# Patient Record
Sex: MALE | Race: WHITE | NOT HISPANIC OR LATINO | URBAN - METROPOLITAN AREA
[De-identification: names, ages, dates, MRNs, and addresses within clinical notes are randomized per-mention and may not be internally consistent; named-entity substitution may affect disease eponyms.]

---

## 2018-09-05 ENCOUNTER — INPATIENT (INPATIENT)
Facility: HOSPITAL | Age: 83
LOS: 5 days | Discharge: NURSING, REHAB OR LONG-TERM CARE | DRG: 690 | End: 2018-09-11
Attending: INTERNAL MEDICINE | Admitting: INTERNAL MEDICINE
Payer: MEDICARE

## 2018-09-05 VITALS
SYSTOLIC BLOOD PRESSURE: 162 MMHG | TEMPERATURE: 98 F | HEART RATE: 78 BPM | DIASTOLIC BLOOD PRESSURE: 86 MMHG | OXYGEN SATURATION: 98 % | RESPIRATION RATE: 20 BRPM

## 2018-09-05 DIAGNOSIS — R41.82 ALTERED MENTAL STATUS, UNSPECIFIED: ICD-10-CM

## 2018-09-05 LAB
ALBUMIN SERPL ELPH-MCNC: 4.3 G/DL — SIGNIFICANT CHANGE UP (ref 3.3–5)
ALP SERPL-CCNC: 144 U/L — HIGH (ref 40–120)
ALT FLD-CCNC: 10 U/L — SIGNIFICANT CHANGE UP (ref 10–45)
ANION GAP SERPL CALC-SCNC: 14 MMOL/L — SIGNIFICANT CHANGE UP (ref 5–17)
AST SERPL-CCNC: 20 U/L — SIGNIFICANT CHANGE UP (ref 10–40)
BASOPHILS # BLD AUTO: 0.1 K/UL — SIGNIFICANT CHANGE UP (ref 0–0.2)
BASOPHILS NFR BLD AUTO: 0.6 % — SIGNIFICANT CHANGE UP (ref 0–2)
BILIRUB SERPL-MCNC: 0.5 MG/DL — SIGNIFICANT CHANGE UP (ref 0.2–1.2)
BUN SERPL-MCNC: 20 MG/DL — SIGNIFICANT CHANGE UP (ref 7–23)
CALCIUM SERPL-MCNC: 9.8 MG/DL — SIGNIFICANT CHANGE UP (ref 8.4–10.5)
CHLORIDE SERPL-SCNC: 97 MMOL/L — SIGNIFICANT CHANGE UP (ref 96–108)
CO2 SERPL-SCNC: 25 MMOL/L — SIGNIFICANT CHANGE UP (ref 22–31)
CREAT SERPL-MCNC: 1.07 MG/DL — SIGNIFICANT CHANGE UP (ref 0.5–1.3)
EOSINOPHIL # BLD AUTO: 0.3 K/UL — SIGNIFICANT CHANGE UP (ref 0–0.5)
EOSINOPHIL NFR BLD AUTO: 3.1 % — SIGNIFICANT CHANGE UP (ref 0–6)
GAS PNL BLDV: SIGNIFICANT CHANGE UP
GLUCOSE SERPL-MCNC: 112 MG/DL — HIGH (ref 70–99)
HCT VFR BLD CALC: 48.2 % — SIGNIFICANT CHANGE UP (ref 39–50)
HGB BLD-MCNC: 15.4 G/DL — SIGNIFICANT CHANGE UP (ref 13–17)
LYMPHOCYTES # BLD AUTO: 2.5 K/UL — SIGNIFICANT CHANGE UP (ref 1–3.3)
LYMPHOCYTES # BLD AUTO: 25.9 % — SIGNIFICANT CHANGE UP (ref 13–44)
MCHC RBC-ENTMCNC: 27.6 PG — SIGNIFICANT CHANGE UP (ref 27–34)
MCHC RBC-ENTMCNC: 31.9 GM/DL — LOW (ref 32–36)
MCV RBC AUTO: 86.6 FL — SIGNIFICANT CHANGE UP (ref 80–100)
MONOCYTES # BLD AUTO: 0.7 K/UL — SIGNIFICANT CHANGE UP (ref 0–0.9)
MONOCYTES NFR BLD AUTO: 7.1 % — SIGNIFICANT CHANGE UP (ref 2–14)
NEUTROPHILS # BLD AUTO: 6.2 K/UL — SIGNIFICANT CHANGE UP (ref 1.8–7.4)
NEUTROPHILS NFR BLD AUTO: 63.4 % — SIGNIFICANT CHANGE UP (ref 43–77)
PLATELET # BLD AUTO: 277 K/UL — SIGNIFICANT CHANGE UP (ref 150–400)
POTASSIUM SERPL-MCNC: 4.5 MMOL/L — SIGNIFICANT CHANGE UP (ref 3.5–5.3)
POTASSIUM SERPL-SCNC: 4.5 MMOL/L — SIGNIFICANT CHANGE UP (ref 3.5–5.3)
PROT SERPL-MCNC: 7.7 G/DL — SIGNIFICANT CHANGE UP (ref 6–8.3)
RBC # BLD: 5.56 M/UL — SIGNIFICANT CHANGE UP (ref 4.2–5.8)
RBC # FLD: 13.2 % — SIGNIFICANT CHANGE UP (ref 10.3–14.5)
SODIUM SERPL-SCNC: 136 MMOL/L — SIGNIFICANT CHANGE UP (ref 135–145)
TROPONIN T, HIGH SENSITIVITY RESULT: 22 NG/L — SIGNIFICANT CHANGE UP (ref 0–51)
WBC # BLD: 9.8 K/UL — SIGNIFICANT CHANGE UP (ref 3.8–10.5)
WBC # FLD AUTO: 9.8 K/UL — SIGNIFICANT CHANGE UP (ref 3.8–10.5)

## 2018-09-05 PROCEDURE — 74018 RADEX ABDOMEN 1 VIEW: CPT | Mod: 26

## 2018-09-05 PROCEDURE — 93010 ELECTROCARDIOGRAM REPORT: CPT

## 2018-09-05 PROCEDURE — 99285 EMERGENCY DEPT VISIT HI MDM: CPT | Mod: GC,25

## 2018-09-05 PROCEDURE — 71045 X-RAY EXAM CHEST 1 VIEW: CPT | Mod: 26

## 2018-09-05 PROCEDURE — 70450 CT HEAD/BRAIN W/O DYE: CPT | Mod: 26

## 2018-09-05 RX ORDER — HEPARIN SODIUM 5000 [USP'U]/ML
5000 INJECTION INTRAVENOUS; SUBCUTANEOUS EVERY 12 HOURS
Qty: 0 | Refills: 0 | Status: DISCONTINUED | OUTPATIENT
Start: 2018-09-05 | End: 2018-09-11

## 2018-09-05 RX ORDER — CARBIDOPA AND LEVODOPA 25; 100 MG/1; MG/1
1 TABLET ORAL THREE TIMES A DAY
Qty: 0 | Refills: 0 | Status: DISCONTINUED | OUTPATIENT
Start: 2018-09-05 | End: 2018-09-11

## 2018-09-05 RX ORDER — ATORVASTATIN CALCIUM 80 MG/1
20 TABLET, FILM COATED ORAL AT BEDTIME
Qty: 0 | Refills: 0 | Status: DISCONTINUED | OUTPATIENT
Start: 2018-09-05 | End: 2018-09-11

## 2018-09-05 RX ORDER — QUETIAPINE FUMARATE 200 MG/1
25 TABLET, FILM COATED ORAL
Qty: 0 | Refills: 0 | Status: DISCONTINUED | OUTPATIENT
Start: 2018-09-05 | End: 2018-09-11

## 2018-09-05 RX ORDER — SODIUM CHLORIDE 9 MG/ML
1000 INJECTION INTRAMUSCULAR; INTRAVENOUS; SUBCUTANEOUS
Qty: 0 | Refills: 0 | Status: DISCONTINUED | OUTPATIENT
Start: 2018-09-05 | End: 2018-09-09

## 2018-09-05 RX ORDER — SENNA PLUS 8.6 MG/1
2 TABLET ORAL AT BEDTIME
Qty: 0 | Refills: 0 | Status: DISCONTINUED | OUTPATIENT
Start: 2018-09-05 | End: 2018-09-11

## 2018-09-05 RX ORDER — FINASTERIDE 5 MG/1
5 TABLET, FILM COATED ORAL DAILY
Qty: 0 | Refills: 0 | Status: DISCONTINUED | OUTPATIENT
Start: 2018-09-05 | End: 2018-09-11

## 2018-09-05 RX ORDER — HALOPERIDOL DECANOATE 100 MG/ML
1 INJECTION INTRAMUSCULAR EVERY 12 HOURS
Qty: 0 | Refills: 0 | Status: DISCONTINUED | OUTPATIENT
Start: 2018-09-05 | End: 2018-09-11

## 2018-09-05 RX ADMIN — HALOPERIDOL DECANOATE 1 MILLIGRAM(S): 100 INJECTION INTRAMUSCULAR at 20:29

## 2018-09-05 RX ADMIN — Medication 1 ENEMA: at 20:27

## 2018-09-05 RX ADMIN — SODIUM CHLORIDE 70 MILLILITER(S): 9 INJECTION INTRAMUSCULAR; INTRAVENOUS; SUBCUTANEOUS at 22:12

## 2018-09-05 RX ADMIN — HEPARIN SODIUM 5000 UNIT(S): 5000 INJECTION INTRAVENOUS; SUBCUTANEOUS at 22:48

## 2018-09-05 NOTE — ED ADULT NURSE NOTE - OBJECTIVE STATEMENT
85 y/o male A&O x 0 PMH Parkinson's, dementia presents to ED via EMS from home c/o  increased AMS, no BM in 5 days. Per daughter at bedside, pt is normally A&O x1, disoriented to time and place, ambulatory with walker, live sat home with wife and has an aide. Daughter states that yesterday, pt began having increased confusion, b/l leg weakness. She says he was moaning while sleeping, appeared uncomfortable. Today, pt speech garbled and incoherent for the most part. Per aide, pt had not had BM in 5 days. Upon arrival, pt irritable and flailing arms. Lungs clear b/l. Skin warm, dry, intact. Motor intact. 20G IV placed in b/l forearms. NSR on cardiac monitor. Attempted straight cath with OLEGARIO Woods at bedside without success due to resistance. MD aware. Abdomen soft, non-tender, non-distended. Safety and comfort provided.

## 2018-09-05 NOTE — H&P ADULT - NSHPPHYSICALEXAM_GEN_ALL_CORE
PHYSICAL EXAMINATION:  Vital Signs Last 24 Hrs  T(C): 36.8 (05 Sep 2018 19:00), Max: 36.8 (05 Sep 2018 19:00)  T(F): 98.3 (05 Sep 2018 19:00), Max: 98.3 (05 Sep 2018 19:00)  HR: 78 (05 Sep 2018 19:00) (78 - 78)  BP: 162/86 (05 Sep 2018 19:00) (162/86 - 162/86)  BP(mean): --  RR: 20 (05 Sep 2018 19:00) (20 - 20)  SpO2: 98% (05 Sep 2018 19:00) (98% - 98%)  CAPILLARY BLOOD GLUCOSE            GENERAL: NAD, well-groomed,  HEAD:  atraumatic, normocephalic  EYES: sclera anicteric  ENMT: mucous membranes moist  NECK: supple, No JVD  CHEST/LUNG: clear to auscultation bilaterally;    no      rales   ,   no rhonchi,   HEART: normal S1, S2  ABDOMEN: BS+, soft, ND, NT   EXTREMITIES:    no    edema    b/l LEs  NEURO:   non verbal, be d bound,    , tremprs  SKIN: no     rash

## 2018-09-05 NOTE — H&P ADULT - ASSESSMENT
pt    with  h/o  parkinson's ,    dementia,,    v/p  shunt, turp,  hydrocephalus  , admitted   for increasing agitation/  confusion  for past  week per  family at bedside  pt  ha s been bed  bound for past week   denies   fevers/  cp /sob  /a bd  pain  constipated  for more than  5  days, pe r family   pt is  non verbal  labs/  cxr, pending  dehydration/ iv fluids  laxatives/  enema  dvt ppx   PT eval pt    with  h/o  parkinson's ,    dementia,,    v/p  shunt, turp,  hydrocephalus  , admitted   for increasing agitation/  confusion  for past  week per  family, who is   at bedside  pt  ha s been bed  bound for past week   denies   fevers/  cp /sob  /a bd  pain  constipated  for more than  5  days, pe r family   pt is  non verbal  labs/  cxr, pending  dehydration/ iv fluids  laxatives/  enema  concern  for  aspiration  exists,  however  family states he  was  able to  eat , food  that ha s been cut up  on dysphagia  2 diet  dvt ppx   PT eval pt    with  h/o  parkinson's ,    dementia,,    v/p  shunt, turp,  hydrocephalus  , admitted   for increasing agitation/  confusion  for past  week per  family, who is   at bedside  pt  ha s been bed  bound for past week   denies   fevers/  cp /sob  /a bd  pain  constipated  for more than  5  days, pe r family   pt is  non verbal  labs/  cxr, pending  dehydration/ iv fluids  laxatives/  enema  concern  for  aspiration  exists,  however  family states he  was  able to  eat , food  that ha s been cut up  on dysphagia  2 diet/  discussed  with  np  dvt ppx   PT eval pt    with  h/o  parkinson's ,    dementia,,    v/p  shunt, turp,  hydrocephalus  , admitted   for increasing agitation/  confusion  for past  week per  family, who is   at bedside  pt  ha s been bed  bound for past week   denies   fevers/  cp /sob  /a bd  pain  constipated  for more than  5  days, pe r family   pt is  non verbal  labs/  cxr, pending  dehydration/ iv fluids  laxatives/  enema  concern  for  aspiration  exists,  however  family states he  was  able to  eat , food  that ha s been cut up  on dysphagia  2 diet/  discussed  with  np  haldol prn  for agitation, if   agitation worsens,  then  psych  to be called  dvt ppx   PT eval

## 2018-09-05 NOTE — ED PROVIDER NOTE - ATTENDING CONTRIBUTION TO CARE
Patient with worsening confusion. Moderate to severe. Progressive. Increasingly weak. Not better with time. Patient with decreased bowel movements.   Will get iv, cbc, cmp, xray a/p for afl, ct head, ua and reassess  Will follow up on labs, analgesia, reassess and disposition as clinically indicated.

## 2018-09-05 NOTE — ED PROVIDER NOTE - OBJECTIVE STATEMENT
87yo M pmhx Parkinson's, Dementia p/w CC worsening confusion and weakness, family states that yesterday pt. had and episode of screaming and agitation, which resolved, however has been increasingly weak and unable to sit up. Family states pt. is also more confused than usual and does not recognize any of his family members. Has not had BM in 5 days, but also has not been eating much. Family denies fevers, v/d, rash.

## 2018-09-05 NOTE — ED ADULT NURSE REASSESSMENT NOTE - NS ED NURSE REASSESS COMMENT FT1
OLEGARIO Asencio attempted straight cath without success. Blood clots noted. NP Erica aware. Awaiitng bed upstairs.

## 2018-09-05 NOTE — H&P ADULT - HISTORY OF PRESENT ILLNESS
pt  with  h/o  parkinson's ,    dementia,,    v/p  shunt, turp,  hydrocepahlus  , admitted   for increasing agitation/  confusion  for past  week per  family at bedside  pt  ha s been bed  bound for past week   denies   fevers/  cp /spb/a bd  pain  constipated  for more than  5  days, pe r family   pt is  non verbal

## 2018-09-05 NOTE — ED ADULT NURSE NOTE - NSIMPLEMENTINTERV_GEN_ALL_ED
Implemented All Fall with Harm Risk Interventions:  [place] to call system. Call bell, personal items and telephone within reach. Instruct patient to call for assistance. Room bathroom lighting operational. Non-slip footwear when patient is off stretcher. Physically safe environment: no spills, clutter or unnecessary equipment. Stretcher in lowest position, wheels locked, appropriate side rails in place. Provide visual cue, wrist band, yellow gown, etc. Monitor gait and stability. Monitor for mental status changes and reorient to person, place, and time. Review medications for side effects contributing to fall risk. Reinforce activity limits and safety measures with patient and family. Provide visual clues: red socks.

## 2018-09-05 NOTE — H&P ADULT - PMH
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

## 2018-09-05 NOTE — ED ADULT NURSE REASSESSMENT NOTE - NS ED NURSE REASSESS COMMENT FT1
Pt very agitated and flailing arms. Straight cath attempted with OLEGARIO Woods at bedside to confirm sterility. Resistance met and no urine obtained. MD Tracy aware. 5mg haldol given for pt to go to CT.

## 2018-09-06 LAB
ALBUMIN SERPL ELPH-MCNC: 4.2 G/DL — SIGNIFICANT CHANGE UP (ref 3.3–5)
ALP SERPL-CCNC: 144 U/L — HIGH (ref 40–120)
ALT FLD-CCNC: 18 U/L — SIGNIFICANT CHANGE UP (ref 10–45)
ANION GAP SERPL CALC-SCNC: 14 MMOL/L — SIGNIFICANT CHANGE UP (ref 5–17)
APPEARANCE UR: ABNORMAL
AST SERPL-CCNC: 17 U/L — SIGNIFICANT CHANGE UP (ref 10–40)
BILIRUB SERPL-MCNC: 0.6 MG/DL — SIGNIFICANT CHANGE UP (ref 0.2–1.2)
BILIRUB UR-MCNC: NEGATIVE — SIGNIFICANT CHANGE UP
BUN SERPL-MCNC: 21 MG/DL — SIGNIFICANT CHANGE UP (ref 7–23)
CALCIUM SERPL-MCNC: 9.3 MG/DL — SIGNIFICANT CHANGE UP (ref 8.4–10.5)
CHLORIDE SERPL-SCNC: 99 MMOL/L — SIGNIFICANT CHANGE UP (ref 96–108)
CO2 SERPL-SCNC: 23 MMOL/L — SIGNIFICANT CHANGE UP (ref 22–31)
COLOR SPEC: YELLOW — SIGNIFICANT CHANGE UP
CREAT SERPL-MCNC: 1.14 MG/DL — SIGNIFICANT CHANGE UP (ref 0.5–1.3)
DIFF PNL FLD: ABNORMAL
GLUCOSE SERPL-MCNC: 124 MG/DL — HIGH (ref 70–99)
GLUCOSE UR QL: NEGATIVE — SIGNIFICANT CHANGE UP
HCT VFR BLD CALC: 45.5 % — SIGNIFICANT CHANGE UP (ref 39–50)
HGB BLD-MCNC: 14.9 G/DL — SIGNIFICANT CHANGE UP (ref 13–17)
KETONES UR-MCNC: ABNORMAL
LACTATE SERPL-SCNC: 1.7 MMOL/L — SIGNIFICANT CHANGE UP (ref 0.7–2)
LACTATE SERPL-SCNC: 4 MMOL/L — CRITICAL HIGH (ref 0.7–2)
LEUKOCYTE ESTERASE UR-ACNC: ABNORMAL
MCHC RBC-ENTMCNC: 28.3 PG — SIGNIFICANT CHANGE UP (ref 27–34)
MCHC RBC-ENTMCNC: 32.7 GM/DL — SIGNIFICANT CHANGE UP (ref 32–36)
MCV RBC AUTO: 86.6 FL — SIGNIFICANT CHANGE UP (ref 80–100)
NITRITE UR-MCNC: NEGATIVE — SIGNIFICANT CHANGE UP
PH UR: 6 — SIGNIFICANT CHANGE UP (ref 5–8)
PLATELET # BLD AUTO: 274 K/UL — SIGNIFICANT CHANGE UP (ref 150–400)
POTASSIUM SERPL-MCNC: 3.8 MMOL/L — SIGNIFICANT CHANGE UP (ref 3.5–5.3)
POTASSIUM SERPL-SCNC: 3.8 MMOL/L — SIGNIFICANT CHANGE UP (ref 3.5–5.3)
PROT SERPL-MCNC: 7.3 G/DL — SIGNIFICANT CHANGE UP (ref 6–8.3)
PROT UR-MCNC: 30 MG/DL
RBC # BLD: 5.26 M/UL — SIGNIFICANT CHANGE UP (ref 4.2–5.8)
RBC # FLD: 13.1 % — SIGNIFICANT CHANGE UP (ref 10.3–14.5)
SODIUM SERPL-SCNC: 136 MMOL/L — SIGNIFICANT CHANGE UP (ref 135–145)
SP GR SPEC: 1.02 — SIGNIFICANT CHANGE UP (ref 1.01–1.02)
UROBILINOGEN FLD QL: 1 MG/DL
WBC # BLD: 11.5 K/UL — HIGH (ref 3.8–10.5)
WBC # FLD AUTO: 11.5 K/UL — HIGH (ref 3.8–10.5)

## 2018-09-06 RX ORDER — CEFTRIAXONE 500 MG/1
1 INJECTION, POWDER, FOR SOLUTION INTRAMUSCULAR; INTRAVENOUS ONCE
Qty: 0 | Refills: 0 | Status: COMPLETED | OUTPATIENT
Start: 2018-09-06 | End: 2018-09-06

## 2018-09-06 RX ORDER — SODIUM CHLORIDE 9 MG/ML
1000 INJECTION INTRAMUSCULAR; INTRAVENOUS; SUBCUTANEOUS ONCE
Qty: 0 | Refills: 0 | Status: COMPLETED | OUTPATIENT
Start: 2018-09-06 | End: 2018-09-06

## 2018-09-06 RX ORDER — CEFTRIAXONE 500 MG/1
1 INJECTION, POWDER, FOR SOLUTION INTRAMUSCULAR; INTRAVENOUS EVERY 24 HOURS
Qty: 0 | Refills: 0 | Status: DISCONTINUED | OUTPATIENT
Start: 2018-09-07 | End: 2018-09-09

## 2018-09-06 RX ORDER — MORPHINE SULFATE 50 MG/1
1 CAPSULE, EXTENDED RELEASE ORAL ONCE
Qty: 0 | Refills: 0 | Status: DISCONTINUED | OUTPATIENT
Start: 2018-09-06 | End: 2018-09-06

## 2018-09-06 RX ORDER — CEFTRIAXONE 500 MG/1
INJECTION, POWDER, FOR SOLUTION INTRAMUSCULAR; INTRAVENOUS
Qty: 0 | Refills: 0 | Status: DISCONTINUED | OUTPATIENT
Start: 2018-09-06 | End: 2018-09-09

## 2018-09-06 RX ORDER — HALOPERIDOL DECANOATE 100 MG/ML
1 INJECTION INTRAMUSCULAR ONCE
Qty: 0 | Refills: 0 | Status: DISCONTINUED | OUTPATIENT
Start: 2018-09-06 | End: 2018-09-11

## 2018-09-06 RX ORDER — DUTASTERIDE 0.5 MG/1
1 CAPSULE, LIQUID FILLED ORAL
Qty: 0 | Refills: 0 | COMMUNITY

## 2018-09-06 RX ORDER — ACETAMINOPHEN 500 MG
1000 TABLET ORAL ONCE
Qty: 0 | Refills: 0 | Status: COMPLETED | OUTPATIENT
Start: 2018-09-06 | End: 2018-09-06

## 2018-09-06 RX ADMIN — HEPARIN SODIUM 5000 UNIT(S): 5000 INJECTION INTRAVENOUS; SUBCUTANEOUS at 13:49

## 2018-09-06 RX ADMIN — MORPHINE SULFATE 1 MILLIGRAM(S): 50 CAPSULE, EXTENDED RELEASE ORAL at 03:24

## 2018-09-06 RX ADMIN — MORPHINE SULFATE 1 MILLIGRAM(S): 50 CAPSULE, EXTENDED RELEASE ORAL at 04:00

## 2018-09-06 RX ADMIN — ATORVASTATIN CALCIUM 20 MILLIGRAM(S): 80 TABLET, FILM COATED ORAL at 22:15

## 2018-09-06 RX ADMIN — HALOPERIDOL DECANOATE 1 MILLIGRAM(S): 100 INJECTION INTRAMUSCULAR at 22:15

## 2018-09-06 RX ADMIN — SODIUM CHLORIDE 70 MILLILITER(S): 9 INJECTION INTRAMUSCULAR; INTRAVENOUS; SUBCUTANEOUS at 13:55

## 2018-09-06 RX ADMIN — SENNA PLUS 2 TABLET(S): 8.6 TABLET ORAL at 22:15

## 2018-09-06 RX ADMIN — CARBIDOPA AND LEVODOPA 1 TABLET(S): 25; 100 TABLET ORAL at 22:15

## 2018-09-06 RX ADMIN — HEPARIN SODIUM 5000 UNIT(S): 5000 INJECTION INTRAVENOUS; SUBCUTANEOUS at 22:15

## 2018-09-06 RX ADMIN — CEFTRIAXONE 100 GRAM(S): 500 INJECTION, POWDER, FOR SOLUTION INTRAMUSCULAR; INTRAVENOUS at 08:04

## 2018-09-06 RX ADMIN — SODIUM CHLORIDE 2000 MILLILITER(S): 9 INJECTION INTRAMUSCULAR; INTRAVENOUS; SUBCUTANEOUS at 06:57

## 2018-09-06 RX ADMIN — QUETIAPINE FUMARATE 25 MILLIGRAM(S): 200 TABLET, FILM COATED ORAL at 22:16

## 2018-09-06 RX ADMIN — SODIUM CHLORIDE 70 MILLILITER(S): 9 INJECTION INTRAMUSCULAR; INTRAVENOUS; SUBCUTANEOUS at 22:16

## 2018-09-06 RX ADMIN — HALOPERIDOL DECANOATE 1 MILLIGRAM(S): 100 INJECTION INTRAMUSCULAR at 08:39

## 2018-09-06 RX ADMIN — Medication 400 MILLIGRAM(S): at 13:49

## 2018-09-06 NOTE — PROGRESS NOTE ADULT - SUBJECTIVE AND OBJECTIVE BOX
Male  Patient is a 86y old  Male who presents with a chief complaint of agitation (05 Sep 2018 19:31)      HPI:  pt  with  h/o  parkinson's ,    dementia,,    v/p  shunt, turp,  hydrocepahlus  , admitted   for increasing agitation/  confusion  for past  week per  family at bedside  pt  ha s been bed  bound for past week   denies   fevers/  cp /spb/a bd  pain  constipated  for more than  5  days, pe r family   pt is  non verbal (05 Sep 2018 19:31)    Medicine attending    Pt sent in from home for admission after d/w pt daughter Sammie Acosta  Pt has deteriorated rapidly.  worsening dementia and confusion with behavioral outbursts, usually screaming.  He often does not recognize family members.      Vital Signs Last 24 Hrs  T(C): 37.1 (06 Sep 2018 06:30), Max: 37.1 (06 Sep 2018 06:30)  T(F): 98.8 (06 Sep 2018 06:30), Max: 98.8 (06 Sep 2018 06:30)  HR: 87 (06 Sep 2018 06:30) (70 - 87)  BP: 138/66 (06 Sep 2018 06:30) (127/61 - 163/61)  BP(mean): --  RR: 20 (06 Sep 2018 06:30) (20 - 20)  SpO2: 98% (06 Sep 2018 06:30) (98% - 98%)  Daily     Daily     09-05 @ 07:01  -  09-06 @ 07:00  --------------------------------------------------------  IN: 0 mL / OUT: 175 mL / NET: -175 mL        PHYSICAL EXAM:    Eyes: Open to arousal, non verbal    ENMT: anicteric    Neck: No increased JVP    Respiratory: Lungs clear    Cardiovascular:  RRR nl S1S2, no M    Gastrointestinal:  Soft, NT    Extremities:  No edema    Neurological: Non verbal, moves all fours    Skin: Warm and dry    Psychiatric:  Calm currently                                14.9   11.5  )-----------( 274      ( 06 Sep 2018 06:44 )             45.5     09-06    136  |  99  |  21  ----------------------------<  124<H>  3.8   |  23  |  1.14    Ca    9.3      06 Sep 2018 05:41    TPro  7.3  /  Alb  4.2  /  TBili  0.6  /  DBili  x   /  AST  17  /  ALT  18  /  AlkPhos  144<H>  09-06          MEDICATIONS  (STANDING):  atorvastatin 20 milliGRAM(s) Oral at bedtime  carbidopa/levodopa  25/100 1 Tablet(s) Oral three times a day  cefTRIAXone   IVPB      finasteride 5 milliGRAM(s) Oral daily  heparin  Injectable 5000 Unit(s) SubCutaneous every 12 hours  QUEtiapine 25 milliGRAM(s) Oral two times a day  senna 2 Tablet(s) Oral at bedtime  sodium chloride 0.9%. 1000 milliLiter(s) (70 mL/Hr) IV Continuous <Continuous>    MEDICATIONS  (PRN):  haloperidol    Injectable 1 milliGRAM(s) IntraMuscular every 12 hours PRN Agitation      Impression    Depression, Dementia, NPH, PD with overall worsening. Occasional behavioral abnormalities.  Possible UTI contributing.    Plan  Haldol and Seroquel as ordered  Psychiatry evaluation if pt behavior and agitation difficult to control  Abx as ordered.  VTE prophylaxis  Case management and social work to work with family for discharge planning.    Abdi Blas MD  [phone]

## 2018-09-06 NOTE — CHART NOTE - NSCHARTNOTEFT_GEN_A_CORE
85 y/o male with PMHx of Parkinson's, dementia, hydrocephalus, s/p  Shunt, and s/p turp admitted for increasing agitation and confusion for past week per family at bedside  Pt has been bed bound for past week, did not eat anything today. Family denies fevers/cp/spb/abd  pain. Pt was constipated x 5 days, s/p enema in ED with good result.   Pt with orders for UA and urine cx as part of AMS workup.     Difficulty with straight cath in ED, as per RN Vicki's note, red straight catheter coiling and noted blood clot from urethral meatus.   In holding straight cath again attempted by RN for urine specimens with coude catheter.   Per RN, tyler blood noted and coiling again, procedure stopped, as unable to pass catheter.   Pt seen and examined in pain, nonverbal, moaning noted.   Morphine 1mg IV x 1 for pain.   Bladder scan with 200 cc urine.   Pt has not voided x 6 hours.  Urology paged for assistance with straight cath.    Erica Arreaga ANP-BC  [id number]

## 2018-09-06 NOTE — PROCEDURE NOTE - ADDITIONAL PROCEDURE DETAILS
called for straight cath for urine sample  unsuccessful attempt by RN staff  18F coude inserted easily under typical sterile technique  ~175cc clear yellow urine returned  urine sample obtained  catheter removed intact  bleeding from meatus noted

## 2018-09-06 NOTE — PROCEDURE NOTE - NSPROCDETAILS_GEN_ALL_CORE
sterile technique, non-indwelling urinary device inserted/a urinary catheter insertion kit was used for all insertion materials

## 2018-09-07 LAB
ANION GAP SERPL CALC-SCNC: 15 MMOL/L — SIGNIFICANT CHANGE UP (ref 5–17)
BUN SERPL-MCNC: 16 MG/DL — SIGNIFICANT CHANGE UP (ref 7–23)
CALCIUM SERPL-MCNC: 8.9 MG/DL — SIGNIFICANT CHANGE UP (ref 8.4–10.5)
CHLORIDE SERPL-SCNC: 104 MMOL/L — SIGNIFICANT CHANGE UP (ref 96–108)
CO2 SERPL-SCNC: 23 MMOL/L — SIGNIFICANT CHANGE UP (ref 22–31)
CREAT SERPL-MCNC: 1.01 MG/DL — SIGNIFICANT CHANGE UP (ref 0.5–1.3)
CULTURE RESULTS: SIGNIFICANT CHANGE UP
GLUCOSE SERPL-MCNC: 121 MG/DL — HIGH (ref 70–99)
HCT VFR BLD CALC: 42.8 % — SIGNIFICANT CHANGE UP (ref 39–50)
HGB BLD-MCNC: 13.8 G/DL — SIGNIFICANT CHANGE UP (ref 13–17)
MCHC RBC-ENTMCNC: 28.4 PG — SIGNIFICANT CHANGE UP (ref 27–34)
MCHC RBC-ENTMCNC: 32.2 GM/DL — SIGNIFICANT CHANGE UP (ref 32–36)
MCV RBC AUTO: 88.1 FL — SIGNIFICANT CHANGE UP (ref 80–100)
PLATELET # BLD AUTO: 224 K/UL — SIGNIFICANT CHANGE UP (ref 150–400)
POTASSIUM SERPL-MCNC: 4.6 MMOL/L — SIGNIFICANT CHANGE UP (ref 3.5–5.3)
POTASSIUM SERPL-SCNC: 4.6 MMOL/L — SIGNIFICANT CHANGE UP (ref 3.5–5.3)
RBC # BLD: 4.86 M/UL — SIGNIFICANT CHANGE UP (ref 4.2–5.8)
RBC # FLD: 13.8 % — SIGNIFICANT CHANGE UP (ref 10.3–14.5)
SODIUM SERPL-SCNC: 142 MMOL/L — SIGNIFICANT CHANGE UP (ref 135–145)
SPECIMEN SOURCE: SIGNIFICANT CHANGE UP
WBC # BLD: 11.34 K/UL — HIGH (ref 3.8–10.5)
WBC # FLD AUTO: 11.34 K/UL — HIGH (ref 3.8–10.5)

## 2018-09-07 RX ADMIN — ATORVASTATIN CALCIUM 20 MILLIGRAM(S): 80 TABLET, FILM COATED ORAL at 22:24

## 2018-09-07 RX ADMIN — CARBIDOPA AND LEVODOPA 1 TABLET(S): 25; 100 TABLET ORAL at 06:31

## 2018-09-07 RX ADMIN — SODIUM CHLORIDE 70 MILLILITER(S): 9 INJECTION INTRAMUSCULAR; INTRAVENOUS; SUBCUTANEOUS at 22:28

## 2018-09-07 RX ADMIN — CARBIDOPA AND LEVODOPA 1 TABLET(S): 25; 100 TABLET ORAL at 13:52

## 2018-09-07 RX ADMIN — HEPARIN SODIUM 5000 UNIT(S): 5000 INJECTION INTRAVENOUS; SUBCUTANEOUS at 22:25

## 2018-09-07 RX ADMIN — CEFTRIAXONE 100 GRAM(S): 500 INJECTION, POWDER, FOR SOLUTION INTRAMUSCULAR; INTRAVENOUS at 06:31

## 2018-09-07 RX ADMIN — QUETIAPINE FUMARATE 25 MILLIGRAM(S): 200 TABLET, FILM COATED ORAL at 10:29

## 2018-09-07 RX ADMIN — SODIUM CHLORIDE 70 MILLILITER(S): 9 INJECTION INTRAMUSCULAR; INTRAVENOUS; SUBCUTANEOUS at 12:46

## 2018-09-07 RX ADMIN — FINASTERIDE 5 MILLIGRAM(S): 5 TABLET, FILM COATED ORAL at 12:21

## 2018-09-07 RX ADMIN — CARBIDOPA AND LEVODOPA 1 TABLET(S): 25; 100 TABLET ORAL at 22:27

## 2018-09-07 RX ADMIN — HEPARIN SODIUM 5000 UNIT(S): 5000 INJECTION INTRAVENOUS; SUBCUTANEOUS at 10:29

## 2018-09-07 RX ADMIN — QUETIAPINE FUMARATE 25 MILLIGRAM(S): 200 TABLET, FILM COATED ORAL at 22:27

## 2018-09-07 RX ADMIN — SENNA PLUS 2 TABLET(S): 8.6 TABLET ORAL at 22:27

## 2018-09-07 NOTE — PROGRESS NOTE ADULT - SUBJECTIVE AND OBJECTIVE BOX
Sleeping quietly  No events.  Remains on abx d2. for presumed UTI      Vital Signs Last 24 Hrs  T(C): 37.2 (07 Sep 2018 04:51), Max: 37.7 (06 Sep 2018 15:24)  T(F): 98.9 (07 Sep 2018 04:51), Max: 99.8 (06 Sep 2018 15:24)  HR: 72 (07 Sep 2018 04:51) (72 - 97)  BP: 100/79 (07 Sep 2018 04:51) (100/79 - 153/99)  BP(mean): --  RR: 18 (07 Sep 2018 04:51) (18 - 20)  SpO2: 96% (07 Sep 2018 04:51) (94% - 97%)  Daily     Daily         PHYSICAL EXAM:    Eyes: Open to arousal, non verbal    ENMT: anicteric    Neck: No increased JVP    Respiratory: Lungs clear    Cardiovascular:  RRR nl S1S2, no M    Gastrointestinal:  Soft, NT    Extremities:  No edema    Neurological: Non verbal, moves all fours    Skin: Warm and dry    Psychiatric:  Calm currently                                14.9   11.5  )-----------( 274      ( 06 Sep 2018 06:44 )             45.5     09-07    142  |  104  |  16  ----------------------------<  121<H>  4.6   |  23  |  1.01    Ca    8.9      07 Sep 2018 06:12    TPro  7.3  /  Alb  4.2  /  TBili  0.6  /  DBili  x   /  AST  17  /  ALT  18  /  AlkPhos  144<H>  09-06          MEDICATIONS  (STANDING):  atorvastatin 20 milliGRAM(s) Oral at bedtime  carbidopa/levodopa  25/100 1 Tablet(s) Oral three times a day  cefTRIAXone   IVPB 1 Gram(s) IV Intermittent every 24 hours  cefTRIAXone   IVPB      finasteride 5 milliGRAM(s) Oral daily  haloperidol    Injectable 1 milliGRAM(s) IntraMuscular once  heparin  Injectable 5000 Unit(s) SubCutaneous every 12 hours  QUEtiapine 25 milliGRAM(s) Oral two times a day  senna 2 Tablet(s) Oral at bedtime  sodium chloride 0.9%. 1000 milliLiter(s) (70 mL/Hr) IV Continuous <Continuous>    MEDICATIONS  (PRN):  haloperidol    Injectable 1 milliGRAM(s) IntraMuscular every 12 hours PRN Agitation    Impression    Depression, Dementia, NPH, PD with overall worsening. Occasional behavioral abnormalities.  Possible UTI contributing.    Plan  Haldol and Seroquel as ordered  Psychiatry evaluation if pt behavior and agitation difficult to control  Abx as ordered.  VTE prophylaxis  Case management and social work to work with family for discharge planning.      Abdi Blas MD  [phone]

## 2018-09-08 LAB
HCT VFR BLD CALC: 39.1 % — SIGNIFICANT CHANGE UP (ref 39–50)
HGB BLD-MCNC: 12.7 G/DL — LOW (ref 13–17)
MCHC RBC-ENTMCNC: 28 PG — SIGNIFICANT CHANGE UP (ref 27–34)
MCHC RBC-ENTMCNC: 32.5 GM/DL — SIGNIFICANT CHANGE UP (ref 32–36)
MCV RBC AUTO: 86.3 FL — SIGNIFICANT CHANGE UP (ref 80–100)
PLATELET # BLD AUTO: 194 K/UL — SIGNIFICANT CHANGE UP (ref 150–400)
RAPID RVP RESULT: SIGNIFICANT CHANGE UP
RBC # BLD: 4.53 M/UL — SIGNIFICANT CHANGE UP (ref 4.2–5.8)
RBC # FLD: 13.7 % — SIGNIFICANT CHANGE UP (ref 10.3–14.5)
WBC # BLD: 8.72 K/UL — SIGNIFICANT CHANGE UP (ref 3.8–10.5)
WBC # FLD AUTO: 8.72 K/UL — SIGNIFICANT CHANGE UP (ref 3.8–10.5)

## 2018-09-08 RX ORDER — ACETAMINOPHEN 500 MG
650 TABLET ORAL EVERY 6 HOURS
Qty: 0 | Refills: 0 | Status: DISCONTINUED | OUTPATIENT
Start: 2018-09-08 | End: 2018-09-11

## 2018-09-08 RX ORDER — MORPHINE SULFATE 50 MG/1
1 CAPSULE, EXTENDED RELEASE ORAL ONCE
Qty: 0 | Refills: 0 | Status: DISCONTINUED | OUTPATIENT
Start: 2018-09-08 | End: 2018-09-08

## 2018-09-08 RX ADMIN — QUETIAPINE FUMARATE 25 MILLIGRAM(S): 200 TABLET, FILM COATED ORAL at 22:24

## 2018-09-08 RX ADMIN — HEPARIN SODIUM 5000 UNIT(S): 5000 INJECTION INTRAVENOUS; SUBCUTANEOUS at 22:24

## 2018-09-08 RX ADMIN — CEFTRIAXONE 100 GRAM(S): 500 INJECTION, POWDER, FOR SOLUTION INTRAMUSCULAR; INTRAVENOUS at 05:00

## 2018-09-08 RX ADMIN — FINASTERIDE 5 MILLIGRAM(S): 5 TABLET, FILM COATED ORAL at 13:09

## 2018-09-08 RX ADMIN — SODIUM CHLORIDE 70 MILLILITER(S): 9 INJECTION INTRAMUSCULAR; INTRAVENOUS; SUBCUTANEOUS at 18:43

## 2018-09-08 RX ADMIN — MORPHINE SULFATE 1 MILLIGRAM(S): 50 CAPSULE, EXTENDED RELEASE ORAL at 06:30

## 2018-09-08 RX ADMIN — CARBIDOPA AND LEVODOPA 1 TABLET(S): 25; 100 TABLET ORAL at 22:24

## 2018-09-08 RX ADMIN — ATORVASTATIN CALCIUM 20 MILLIGRAM(S): 80 TABLET, FILM COATED ORAL at 22:24

## 2018-09-08 RX ADMIN — SENNA PLUS 2 TABLET(S): 8.6 TABLET ORAL at 22:24

## 2018-09-08 RX ADMIN — HEPARIN SODIUM 5000 UNIT(S): 5000 INJECTION INTRAVENOUS; SUBCUTANEOUS at 11:00

## 2018-09-08 RX ADMIN — CARBIDOPA AND LEVODOPA 1 TABLET(S): 25; 100 TABLET ORAL at 06:11

## 2018-09-08 RX ADMIN — QUETIAPINE FUMARATE 25 MILLIGRAM(S): 200 TABLET, FILM COATED ORAL at 13:09

## 2018-09-08 RX ADMIN — SODIUM CHLORIDE 70 MILLILITER(S): 9 INJECTION INTRAMUSCULAR; INTRAVENOUS; SUBCUTANEOUS at 02:16

## 2018-09-08 RX ADMIN — CARBIDOPA AND LEVODOPA 1 TABLET(S): 25; 100 TABLET ORAL at 13:09

## 2018-09-08 RX ADMIN — MORPHINE SULFATE 1 MILLIGRAM(S): 50 CAPSULE, EXTENDED RELEASE ORAL at 06:15

## 2018-09-08 NOTE — PHYSICAL THERAPY INITIAL EVALUATION ADULT - DISCHARGE DISPOSITION, PT EVAL
skilled nursing faciltiy if going home will need total assist WITH ALL FUNCTIONAL ACTIVITY and home PT skilled nursing faciltiy for inpt rehab if going home will need total assist WITH ALL FUNCTIONAL ACTIVITY and home PT; 9/11/18 pt can benefit from subacute rehab to increase functional mobility , strength, balance, endurance ,fall prevention education continued -PT spoke with JA Bhatt re plan for Subacute rehab/rehabilitation facility

## 2018-09-08 NOTE — PHYSICAL THERAPY INITIAL EVALUATION ADULT - IMPAIRMENTS CONTRIBUTING IMPAIRED BED MOBILITY, REHAB EVAL
impaired balance/abnormal muscle tone/cognition/decreased strength/decreased ROM impaired balance/pt sat x 6 min with min of 1 work on weight shift and balance and using ue's to help support and feet on floor/decreased ROM/cognition/abnormal muscle tone/decreased strength

## 2018-09-08 NOTE — PHYSICAL THERAPY INITIAL EVALUATION ADULT - BALANCE DISTURBANCE, IDENTIFIED IMPAIRMENT CONTRIBUTE, REHAB EVAL
impaired coordination/abnormal muscle tone/decreased strength/decreased ROM abnormal muscle tone/decreased strength/decreased endurance/decreased ROM/impaired coordination

## 2018-09-08 NOTE — PHYSICAL THERAPY INITIAL EVALUATION ADULT - ADDITIONAL COMMENTS
Patient lives in pvt home with his wife Danielle,from a second marriage,  who has  hired care M-F 9-5 and Sat x 7hrs to assist with patient's care. obtained the info from care coordination notes, none family present . patient is confused Patient lives in pvt home with his wife Danielle,from a second marriage,  who has  hired care M-F 9-5 and Sat x 7hrs to assist with patient's care. obtained the info from care coordination notes, none family present . patient is confused; pt per CM note amb with rolling walker with assist PTA , has chair lift on steps Patient lives in pvt home with his wife Danielle,from a second marriage,  who has  hired care M-F 9-5 and Sat x 7hrs to assist with patient's care. obtained the info from care coordination notes, none family present . patient is confused; pt per dtr Sammie could amb with supervision at home with rolling walker , assist with adl's (has shower chair ) and has electric recliner ,does not own a w/c

## 2018-09-08 NOTE — PHYSICAL THERAPY INITIAL EVALUATION ADULT - TRANSFER TRAINING, PT EVAL
patient will perform transfer with mod  assitx1  within 2 weeks patient will perform sit-stand at rolling walker transfer with mod  assitx1  within 2 weeks

## 2018-09-08 NOTE — PHYSICAL THERAPY INITIAL EVALUATION ADULT - PERTINENT HX OF CURRENT PROBLEM, REHAB EVAL
86 yr old male  pt    with  h/o  parkinson's ,    dementia,,    v/p  shunt, turp,  hydrocephalus  admitted   for increasing agitation/  confusion  for past  week per  family at bedside

## 2018-09-08 NOTE — PHYSICAL THERAPY INITIAL EVALUATION ADULT - GAIT DEVIATIONS NOTED, PT EVAL
decreased step length/decreased deena/decreased weight-shifting ability/increased time in double stance/decreased stride length

## 2018-09-08 NOTE — PHYSICAL THERAPY INITIAL EVALUATION ADULT - IMPAIRED TRANSFERS: SIT/STAND, REHAB EVAL
impaired balance/decreased flexibility/decreased strength/decreased endurance ; vc to stand upright , work on weight shift and balance min-mod unsteady/impaired postural control/cognition

## 2018-09-08 NOTE — PHYSICAL THERAPY INITIAL EVALUATION ADULT - FOLLOWS COMMANDS/ANSWERS QUESTIONS, REHAB EVAL
unable to follow commands unable to follow commands/follow simple 1-2 step commands w/ visual and verbal cues/50% of the time

## 2018-09-08 NOTE — PROGRESS NOTE ADULT - SUBJECTIVE AND OBJECTIVE BOX
dementia,  no complaints  REVIEW OF SYSTEMS:  GEN: no fever,    no chills  RESP: no SOB,   no cough  CVS: no chest pain,   no palpitations  GI: no abdominal pain,   no nausea,   no vomiting,   no constipation,   no diarrhea  : no dysuria,   no frequency  NEURO: no headache,   no dizziness  PSYCH: no depression,   not anxious  Derm : no rash    MEDICATIONS  (STANDING):  atorvastatin 20 milliGRAM(s) Oral at bedtime  carbidopa/levodopa  25/100 1 Tablet(s) Oral three times a day  cefTRIAXone   IVPB 1 Gram(s) IV Intermittent every 24 hours  cefTRIAXone   IVPB      finasteride 5 milliGRAM(s) Oral daily  haloperidol    Injectable 1 milliGRAM(s) IntraMuscular once  heparin  Injectable 5000 Unit(s) SubCutaneous every 12 hours  QUEtiapine 25 milliGRAM(s) Oral two times a day  senna 2 Tablet(s) Oral at bedtime  sodium chloride 0.9%. 1000 milliLiter(s) (70 mL/Hr) IV Continuous <Continuous>    MEDICATIONS  (PRN):  haloperidol    Injectable 1 milliGRAM(s) IntraMuscular every 12 hours PRN Agitation      Vital Signs Last 24 Hrs  T(C): 37.2 (08 Sep 2018 04:21), Max: 38.1 (07 Sep 2018 16:23)  T(F): 98.9 (08 Sep 2018 04:21), Max: 100.6 (07 Sep 2018 16:23)  HR: 81 (08 Sep 2018 04:21) (78 - 92)  BP: 157/81 (08 Sep 2018 04:21) (107/53 - 158/82)  BP(mean): --  RR: 20 (08 Sep 2018 04:21) (18 - 20)  SpO2: 97% (08 Sep 2018 04:21) (95% - 98%)  CAPILLARY BLOOD GLUCOSE        I&O's Summary    07 Sep 2018 07:01  -  08 Sep 2018 07:00  --------------------------------------------------------  IN: 0 mL / OUT: 500 mL / NET: -500 mL        PHYSICAL EXAM:  HEAD:  Atraumatic, Normocephalic  NECK: Supple, No   JVD  CHEST/LUNG:   no     rales,     no,    rhonchi  HEART: Regular rate and rhythm;         murmur  ABDOMEN: Soft, Nontender, ;   EXTREMITIES:        edema  NEUROLOGY:  alert    LABS:                        13.8   11.34 )-----------( 224      ( 07 Sep 2018 07:56 )             42.8     09-07    142  |  104  |  16  ----------------------------<  121<H>  4.6   |  23  |  1.01    Ca    8.9      07 Sep 2018 06:12              Lactate, Blood: 1.7 mmol/L (09-06 @ 10:31)                  Consultant(s) Notes Reviewed:      Care Discussed with Consultants/Other Providers: dementia,, comfortablw  REVIEW OF SYSTEMS:  GEN: no fever,    no chills  RESP: no SOB,   no cough  CVS: no chest pain,   no palpitations  GI: no abdominal pain,   no nausea,   no vomiting,   no constipation,   no diarrhea  : no dysuria,   no frequency  NEURO: no headache,   no dizziness  PSYCH: no depression,   not anxious  Derm : no rash    MEDICATIONS  (STANDING):  atorvastatin 20 milliGRAM(s) Oral at bedtime  carbidopa/levodopa  25/100 1 Tablet(s) Oral three times a day  cefTRIAXone   IVPB 1 Gram(s) IV Intermittent every 24 hours  cefTRIAXone   IVPB      finasteride 5 milliGRAM(s) Oral daily  haloperidol    Injectable 1 milliGRAM(s) IntraMuscular once  heparin  Injectable 5000 Unit(s) SubCutaneous every 12 hours  QUEtiapine 25 milliGRAM(s) Oral two times a day  senna 2 Tablet(s) Oral at bedtime  sodium chloride 0.9%. 1000 milliLiter(s) (70 mL/Hr) IV Continuous <Continuous>    MEDICATIONS  (PRN):  haloperidol    Injectable 1 milliGRAM(s) IntraMuscular every 12 hours PRN Agitation      Vital Signs Last 24 Hrs  T(C): 37.2 (08 Sep 2018 04:21), Max: 38.1 (07 Sep 2018 16:23)  T(F): 98.9 (08 Sep 2018 04:21), Max: 100.6 (07 Sep 2018 16:23)  HR: 81 (08 Sep 2018 04:21) (78 - 92)  BP: 157/81 (08 Sep 2018 04:21) (107/53 - 158/82)  BP(mean): --  RR: 20 (08 Sep 2018 04:21) (18 - 20)  SpO2: 97% (08 Sep 2018 04:21) (95% - 98%)  CAPILLARY BLOOD GLUCOSE        I&O's Summary    07 Sep 2018 07:01  -  08 Sep 2018 07:00  --------------------------------------------------------  IN: 0 mL / OUT: 500 mL / NET: -500 mL        PHYSICAL EXAM:  HEAD:  Atraumatic, Normocephalic  NECK: Supple, No   JVD  CHEST/LUNG:   no     rales,     no,    rhonchi  HEART: Regular rate and rhythm;         murmur  ABDOMEN: Soft, Nontender, ;   EXTREMITIES:      no   edema  NEUROLOGY:   dementia    LABS:                        13.8   11.34 )-----------( 224      ( 07 Sep 2018 07:56 )             42.8     09-07    142  |  104  |  16  ----------------------------<  121<H>  4.6   |  23  |  1.01    Ca    8.9      07 Sep 2018 06:12              Lactate, Blood: 1.7 mmol/L (09-06 @ 10:31)                  Consultant(s) Notes Reviewed:      Care Discussed with Consultants/Other Providers:

## 2018-09-08 NOTE — PHYSICAL THERAPY INITIAL EVALUATION ADULT - ORIENTATION, REHAB EVAL
not oriented to person, place, time or situation person/not oriented to person, place, time or situation/orient to person 9/11/18

## 2018-09-08 NOTE — CONSULT NOTE ADULT - ASSESSMENT
82 y/o male with PMH of Hydrophallus s/p  shunt placed, Parkinson Dz. Dementia, DM II and Hyperlipidemia presents with increased confusion, confusion work up as per medicine   the patient was noted to have a fever 100.6 yesterday ddx include UTI, he is at high risk for aspiration pna, he is also constipated   reviewed his white cell count is normal range     plan  1.	agree with ctx for day 3 will cover uti and aspiration pna, plan for a seven day course, can change to oral ceftin 500 mg po bid when the patient is able to be switch, he is currently confused   2.	if he spikes fever again would obtain blood cx, will check a pct level in am   3.	continue supportive care as per medicine

## 2018-09-08 NOTE — PHYSICAL THERAPY INITIAL EVALUATION ADULT - PLANNED THERAPY INTERVENTIONS, PT EVAL
bed mobility training/ROM/strengthening/balance training/transfer training gait training/strengthening/balance training/ROM/bed mobility training/transfer training

## 2018-09-08 NOTE — PHYSICAL THERAPY INITIAL EVALUATION ADULT - IMPAIRMENTS FOUND, PT EVAL
muscle strength/ROM/cognitive impairment/gait, locomotion, and balance/cranial and peripheral nerve integrity/arousal, attention, and cognition joint integrity and mobility/cognitive impairment/ROM/muscle strength/gait, locomotion, and balance/arousal, attention, and cognition/tone/aerobic capacity/endurance/cranial and peripheral nerve integrity

## 2018-09-08 NOTE — PHYSICAL THERAPY INITIAL EVALUATION ADULT - STRENGTHENING, PT EVAL
increase strengthby 1/2 to 1 grade throughout increase strength by 1/2 to 1 grade throughout in 3 weeks

## 2018-09-08 NOTE — PHYSICAL THERAPY INITIAL EVALUATION ADULT - LEVEL OF INDEPENDENCE: SUPINE/SIT, REHAB EVAL
maximum assist (25% patients effort) maximum assist (25% patients effort)/moderate assist (50% patients effort)

## 2018-09-08 NOTE — PROGRESS NOTE ADULT - ASSESSMENT
pt    with  h/o  parkinson's ,    dementia,,    v/p  shunt, turp,  hydrocephalus  , admitted   for increasing agitation/  confusion  for past  week per  family,   pt  ha s been bed  bound for past week  constipated  for more than  5  days, pe r family   pt is  non verbal  dehydration/ iv fluids  laxatives/  enema  on dysphagia  2 diet/   haldol prn  for agitation, if   agitation worsens,  then  psych  to be called  dvt ppx   PT eval  low grade fever, urine  . c/ s  negative/ remains  on rocephin  rvp  ID brock

## 2018-09-08 NOTE — PHYSICAL THERAPY INITIAL EVALUATION ADULT - MANUAL MUSCLE TESTING RESULTS, REHAB EVAL
grossly 2- to 2+/5 throughout grossly 2- to 2+/5 throughout; 9/11/18 shoulders 2+ to 3-/5 R and 2+/5 L ; elbows 3-/5 , fingers/wrist 3/5 ; hips 2+/5 , knees 2+/5 , feet/ankles 2+/5 to 3-/5

## 2018-09-08 NOTE — PHYSICAL THERAPY INITIAL EVALUATION ADULT - RANGE OF MOTION, PT EVAL
INCREASE AROM/AAROM BY 40 to 45 degrees INCREASE AROM/AAROM BY 40 to 45 degrees;revised 9/11/18 increase arom/aarom by 15- 20 degrees in 2-3 weeks

## 2018-09-08 NOTE — PHYSICAL THERAPY INITIAL EVALUATION ADULT - MUSCLE TONE ASSESSMENT, REHAB EVAL
bilateral upper extremities/moderately increased tone/severely increased tone/bilateral lower extremities/rigidity 9/11/18 pt decreased rigidity able to move easier today/bilateral lower extremities/bilateral upper extremities/severely increased tone/rigidity/moderately increased tone

## 2018-09-08 NOTE — PHYSICAL THERAPY INITIAL EVALUATION ADULT - TRANSFER SAFETY CONCERNS NOTED: SIT/STAND, REHAB EVAL
decreased step length/decreased weight-shifting ability/decreased balance during turns/losing balance

## 2018-09-08 NOTE — PHYSICAL THERAPY INITIAL EVALUATION ADULT - GENERAL OBSERVATIONS, REHAB EVAL
Patient supine in bed, vss, iv, , confused Patient supine in bed, vss, iv, , confused; 9/11/18 pt received in bed nad alert and respond to first name Flor ; pt confused , intermittent follow commands 50 % of time in session with verbal and visual cues , bilateral Z floats on feet/ankles , rigidity decreased ;pt able to open and close fingers on own and flex/extend elbows  today 30 to 90 degrees

## 2018-09-08 NOTE — PHYSICAL THERAPY INITIAL EVALUATION ADULT - ACTIVE RANGE OF MOTION EXAMINATION, REHAB EVAL
NOTE : 9/11/18 rom reassess shoulder flex R 90 degrees abd to 60 , L shoulder to 60 flex and abd 40; elbows -30 to 90 degrees ; finger flex/extend arom wfl's ; hips flex 90 degrees , abd 10 degrees and add, knees 0-90 on r and 70 on L aarom , Df to neutral aarom , PF aarom wfl's

## 2018-09-08 NOTE — PHYSICAL THERAPY INITIAL EVALUATION ADULT - IMPAIRMENTS CONTRIBUTING TO GAIT DEVIATIONS, PT EVAL
impaired postural control/decreased endurance , mod unsteady/impaired balance/decreased flexibility/abnormal muscle tone/decreased strength/cognition

## 2018-09-08 NOTE — PHYSICAL THERAPY INITIAL EVALUATION ADULT - CRITERIA FOR SKILLED THERAPEUTIC INTERVENTIONS
functional limitations in following categories/impairments found/therapy frequency/anticipated discharge recommendation/predicted duration of therapy intervention/risk reduction/prevention/rehab potential

## 2018-09-08 NOTE — CONSULT NOTE ADULT - SUBJECTIVE AND OBJECTIVE BOX
HPI:   Patient is a 86y male with 87yo M pmhx Parkinson's, Dementia presented with chief complaint of worsening confusion and weakness. It was also reported that he had episode of screaming and agitation, which resolved, however has been increasingly weak and unable to sit up. Family states he is  also more confused than usual and does not recognize any of his family members. Has not had BM in 5 days, but also has not been eating much. The patient is not verbal HPI obtained from the chart and son at the bedside.   A urine culture was sent and reported to be positive for Aerococcus species and currently he is receiving ctx, we were called to see patient for uti and antibiotic management.     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  Dementia  Hydrocephalus: with  shunt placed  Dilated ventricle  Tremor  Diabetes mellitus  Hyperlipidemia  Parkinson disease  S/P TURP  S/P shoulder replacement: right  S/P hip replacement: b/l  S/P  Shunt      Allergies    No Known Allergies    Intolerances        FAMILY HISTORY: non contributory       SOCIAL HISTORY:  Smoking:  none as per chart      T(F): 98.8 (09-08-18 @ 11:09), Max: 100.6 (09-07-18 @ 16:23)  HR: 80 (09-08-18 @ 11:09)  BP: 169/79 (09-08-18 @ 11:09)  RR: 18 (09-08-18 @ 11:09)  SpO2: 95% (09-08-18 @ 11:09)  Wt(kg): --    PHYSICAL EXAM:  General: chronically ill appearing   Eyes:  anicteric, no conjunctival injection, no discharge  Oropharynx: no lesions or injection 	  Neck: supple, without adenopathy  Lungs: clear to auscultation  Heart: regular rate and rhythm; no murmur, rubs or gallops  Abdomen: soft, nondistended, nontender, without mass or organomegaly  Skin: no lesions  Extremities: no clubbing, cyanosis, or edema  Neurologic: non verbal     LAB RESULTS:                        12.7   8.72  )-----------( 194      ( 08 Sep 2018 09:09 )             39.1     09-07    142  |  104  |  16  ----------------------------<  121<H>  4.6   |  23  |  1.01    Ca    8.9      07 Sep 2018 06:12            MICROBIOLOGY:  RECENT CULTURES:  09-06 @ 05:09 .Urine Catheterized     >100,000 CFU/ml Aerococcus species  "Aerococcus spp. are predictably susceptible to penicillin,  ampicillin, tetracycline, and vancomycin.  All isolates are  resistant to sulfonamides"            RADIOLOGY REVIEWED:      Antimicrobials Day #    cefTRIAXone   IVPB 1 Gram(s) IV Intermittent every 24 hours  cefTRIAXone   IVPB        Other Medications:  atorvastatin 20 milliGRAM(s) Oral at bedtime  carbidopa/levodopa  25/100 1 Tablet(s) Oral three times a day  finasteride 5 milliGRAM(s) Oral daily  haloperidol    Injectable 1 milliGRAM(s) IntraMuscular every 12 hours PRN  haloperidol    Injectable 1 milliGRAM(s) IntraMuscular once  heparin  Injectable 5000 Unit(s) SubCutaneous every 12 hours  QUEtiapine 25 milliGRAM(s) Oral two times a day  senna 2 Tablet(s) Oral at bedtime  sodium chloride 0.9%. 1000 milliLiter(s) IV Continuous <Continuous>  < from: Xray Abdomen 1 View PORTABLE -Urgent (09.05.18 @ 22:27) >  FINDINGS:  Moderate stool burden.  Nonobstructive bowel gas pattern.  There is no evidence of intraperitoneal free air.  There is no evidence of organomegaly or pathologic calcification.  Heterotopic ossification right adductor region.    IMPRESSION:   Moderate stool burden.    Nonobstructive bowel gas pattern without evidence of free air.    < end of copied text >

## 2018-09-09 LAB
ANION GAP SERPL CALC-SCNC: 12 MMOL/L — SIGNIFICANT CHANGE UP (ref 5–17)
BUN SERPL-MCNC: 11 MG/DL — SIGNIFICANT CHANGE UP (ref 7–23)
CALCIUM SERPL-MCNC: 8.5 MG/DL — SIGNIFICANT CHANGE UP (ref 8.4–10.5)
CHLORIDE SERPL-SCNC: 108 MMOL/L — SIGNIFICANT CHANGE UP (ref 96–108)
CO2 SERPL-SCNC: 24 MMOL/L — SIGNIFICANT CHANGE UP (ref 22–31)
CREAT SERPL-MCNC: 1.01 MG/DL — SIGNIFICANT CHANGE UP (ref 0.5–1.3)
GLUCOSE SERPL-MCNC: 105 MG/DL — HIGH (ref 70–99)
HCT VFR BLD CALC: 37.3 % — LOW (ref 39–50)
HGB BLD-MCNC: 11.8 G/DL — LOW (ref 13–17)
MCHC RBC-ENTMCNC: 26.9 PG — LOW (ref 27–34)
MCHC RBC-ENTMCNC: 31.6 GM/DL — LOW (ref 32–36)
MCV RBC AUTO: 85.2 FL — SIGNIFICANT CHANGE UP (ref 80–100)
PLATELET # BLD AUTO: 209 K/UL — SIGNIFICANT CHANGE UP (ref 150–400)
POTASSIUM SERPL-MCNC: 3.4 MMOL/L — LOW (ref 3.5–5.3)
POTASSIUM SERPL-SCNC: 3.4 MMOL/L — LOW (ref 3.5–5.3)
PROCALCITONIN SERPL-MCNC: 0.21 NG/ML — HIGH (ref 0.02–0.1)
RBC # BLD: 4.38 M/UL — SIGNIFICANT CHANGE UP (ref 4.2–5.8)
RBC # FLD: 13.8 % — SIGNIFICANT CHANGE UP (ref 10.3–14.5)
SODIUM SERPL-SCNC: 144 MMOL/L — SIGNIFICANT CHANGE UP (ref 135–145)
WBC # BLD: 7.53 K/UL — SIGNIFICANT CHANGE UP (ref 3.8–10.5)
WBC # FLD AUTO: 7.53 K/UL — SIGNIFICANT CHANGE UP (ref 3.8–10.5)

## 2018-09-09 RX ORDER — CEFUROXIME AXETIL 250 MG
500 TABLET ORAL EVERY 12 HOURS
Qty: 0 | Refills: 0 | Status: DISCONTINUED | OUTPATIENT
Start: 2018-09-10 | End: 2018-09-11

## 2018-09-09 RX ORDER — POTASSIUM CHLORIDE 20 MEQ
20 PACKET (EA) ORAL ONCE
Qty: 0 | Refills: 0 | Status: COMPLETED | OUTPATIENT
Start: 2018-09-09 | End: 2018-09-09

## 2018-09-09 RX ADMIN — FINASTERIDE 5 MILLIGRAM(S): 5 TABLET, FILM COATED ORAL at 12:09

## 2018-09-09 RX ADMIN — Medication 20 MILLIEQUIVALENT(S): at 15:00

## 2018-09-09 RX ADMIN — CEFTRIAXONE 100 GRAM(S): 500 INJECTION, POWDER, FOR SOLUTION INTRAMUSCULAR; INTRAVENOUS at 06:22

## 2018-09-09 RX ADMIN — SENNA PLUS 2 TABLET(S): 8.6 TABLET ORAL at 22:41

## 2018-09-09 RX ADMIN — QUETIAPINE FUMARATE 25 MILLIGRAM(S): 200 TABLET, FILM COATED ORAL at 22:41

## 2018-09-09 RX ADMIN — HEPARIN SODIUM 5000 UNIT(S): 5000 INJECTION INTRAVENOUS; SUBCUTANEOUS at 12:08

## 2018-09-09 RX ADMIN — CARBIDOPA AND LEVODOPA 1 TABLET(S): 25; 100 TABLET ORAL at 22:41

## 2018-09-09 RX ADMIN — ATORVASTATIN CALCIUM 20 MILLIGRAM(S): 80 TABLET, FILM COATED ORAL at 22:41

## 2018-09-09 RX ADMIN — QUETIAPINE FUMARATE 25 MILLIGRAM(S): 200 TABLET, FILM COATED ORAL at 12:07

## 2018-09-09 RX ADMIN — Medication 20 MILLIEQUIVALENT(S): at 15:01

## 2018-09-09 RX ADMIN — CARBIDOPA AND LEVODOPA 1 TABLET(S): 25; 100 TABLET ORAL at 15:01

## 2018-09-09 RX ADMIN — CARBIDOPA AND LEVODOPA 1 TABLET(S): 25; 100 TABLET ORAL at 06:22

## 2018-09-09 RX ADMIN — HEPARIN SODIUM 5000 UNIT(S): 5000 INJECTION INTRAVENOUS; SUBCUTANEOUS at 22:41

## 2018-09-09 NOTE — PROGRESS NOTE ADULT - ASSESSMENT
80 y/o male with PMH of Hydrophallus s/p  shunt placed, Parkinson Dz. Dementia, DM II and Hyperlipidemia presents with increased confusion, confusion work up as per medicine   the patient was noted to have a fever 100.6 yesterday ddx include UTI, he is at high risk for aspiration pna, he is also constipated  reviewed his white cell count is normal range   His urine cx grew aeroccocus     plan  day 4 of abx   1.	start Ceftin 500 mg po bid for three more days for UTI   2.	continue supportive care as per medicine

## 2018-09-09 NOTE — PROGRESS NOTE ADULT - SUBJECTIVE AND OBJECTIVE BOX
dementia, no cp/sob  REVIEW OF SYSTEMS:  GEN: no fever,    no chills  RESP: no SOB,   no cough  CVS: no chest pain,   no palpitations  GI: no abdominal pain,   no nausea,   no vomiting,   no constipation,   no diarrhea  : no dysuria,   no frequency  NEURO: no headache,   no dizziness  PSYCH: no depression,   not anxious  Derm : no rash    MEDICATIONS  (STANDING):  atorvastatin 20 milliGRAM(s) Oral at bedtime  carbidopa/levodopa  25/100 1 Tablet(s) Oral three times a day  cefTRIAXone   IVPB 1 Gram(s) IV Intermittent every 24 hours  cefTRIAXone   IVPB      finasteride 5 milliGRAM(s) Oral daily  haloperidol    Injectable 1 milliGRAM(s) IntraMuscular once  heparin  Injectable 5000 Unit(s) SubCutaneous every 12 hours  QUEtiapine 25 milliGRAM(s) Oral two times a day  senna 2 Tablet(s) Oral at bedtime  sodium chloride 0.9%. 1000 milliLiter(s) (70 mL/Hr) IV Continuous <Continuous>    MEDICATIONS  (PRN):  acetaminophen   Tablet .. 650 milliGRAM(s) Oral every 6 hours PRN Temp greater or equal to 38.5C (101.3F)  haloperidol    Injectable 1 milliGRAM(s) IntraMuscular every 12 hours PRN Agitation      Vital Signs Last 24 Hrs  T(C): 36.8 (09 Sep 2018 04:45), Max: 37.1 (08 Sep 2018 11:09)  T(F): 98.3 (09 Sep 2018 04:45), Max: 98.8 (08 Sep 2018 11:09)  HR: 83 (09 Sep 2018 04:45) (77 - 83)  BP: 150/73 (09 Sep 2018 04:45) (136/67 - 169/79)  BP(mean): --  RR: 20 (09 Sep 2018 04:45) (18 - 20)  SpO2: 96% (09 Sep 2018 04:45) (95% - 96%)  CAPILLARY BLOOD GLUCOSE        I&O's Summary    08 Sep 2018 07:01  -  09 Sep 2018 07:00  --------------------------------------------------------  IN: 2190 mL / OUT: 0 mL / NET: 2190 mL        PHYSICAL EXAM:  HEAD:  Atraumatic, Normocephalic  NECK: Supple, No   JVD  CHEST/LUNG:   no     rales,     no,    rhonchi  HEART: Regular rate and rhythm;         murmur  ABDOMEN: Soft, Nontender, ;   EXTREMITIES:        edema  NEUROLOGY:  alert    LABS:                        12.7   8.72  )-----------( 194      ( 08 Sep 2018 09:09 )             39.1     09-09    144  |  108  |  11  ----------------------------<  105<H>  3.4<L>   |  24  |  1.01    Ca    8.5      09 Sep 2018 06:18                              Consultant(s) Notes Reviewed:      Care Discussed with Consultants/Other Providers: dementia, no cp/sob  REVIEW OF SYSTEMS:  GEN: no fever,    no chills  RESP: no SOB,   no cough  CVS: no chest pain,   no palpitations  GI: no abdominal pain,   no nausea,   no vomiting,   no constipation,   no diarrhea  : no dysuria,   no frequency  NEURO: no headache,   no dizziness  PSYCH: no depression,   not anxious  Derm : no rash    MEDICATIONS  (STANDING):  atorvastatin 20 milliGRAM(s) Oral at bedtime  carbidopa/levodopa  25/100 1 Tablet(s) Oral three times a day  cefTRIAXone   IVPB 1 Gram(s) IV Intermittent every 24 hours  cefTRIAXone   IVPB      finasteride 5 milliGRAM(s) Oral daily  haloperidol    Injectable 1 milliGRAM(s) IntraMuscular once  heparin  Injectable 5000 Unit(s) SubCutaneous every 12 hours  QUEtiapine 25 milliGRAM(s) Oral two times a day  senna 2 Tablet(s) Oral at bedtime  sodium chloride 0.9%. 1000 milliLiter(s) (70 mL/Hr) IV Continuous <Continuous>    MEDICATIONS  (PRN):  acetaminophen   Tablet .. 650 milliGRAM(s) Oral every 6 hours PRN Temp greater or equal to 38.5C (101.3F)  haloperidol    Injectable 1 milliGRAM(s) IntraMuscular every 12 hours PRN Agitation      Vital Signs Last 24 Hrs  T(C): 36.8 (09 Sep 2018 04:45), Max: 37.1 (08 Sep 2018 11:09)  T(F): 98.3 (09 Sep 2018 04:45), Max: 98.8 (08 Sep 2018 11:09)  HR: 83 (09 Sep 2018 04:45) (77 - 83)  BP: 150/73 (09 Sep 2018 04:45) (136/67 - 169/79)  BP(mean): --  RR: 20 (09 Sep 2018 04:45) (18 - 20)  SpO2: 96% (09 Sep 2018 04:45) (95% - 96%)  CAPILLARY BLOOD GLUCOSE        I&O's Summary    08 Sep 2018 07:01  -  09 Sep 2018 07:00  --------------------------------------------------------  IN: 2190 mL / OUT: 0 mL / NET: 2190 mL        PHYSICAL EXAM:  HEAD:  Atraumatic, Normocephalic  NECK: Supple, No   JVD  CHEST/LUNG:   no     rales,     no,    rhonchi  HEART: Regular rate and rhythm;         murmur  ABDOMEN: Soft, Nontender, ;   EXTREMITIES:        edema  NEUROLOGY:  dementia    LABS:                        12.7   8.72  )-----------( 194      ( 08 Sep 2018 09:09 )             39.1     09-09    144  |  108  |  11  ----------------------------<  105<H>  3.4<L>   |  24  |  1.01    Ca    8.5      09 Sep 2018 06:18                              Consultant(s) Notes Reviewed:      Care Discussed with Consultants/Other Providers:

## 2018-09-09 NOTE — PROGRESS NOTE ADULT - SUBJECTIVE AND OBJECTIVE BOX
CC: f/u for UTI     Patient reports nothing he is not verbal     REVIEW OF SYSTEMS:  All other review of systems negative (Comprehensive ROS)      T(F): 97.5 (09-09-18 @ 16:30), Max: 98.8 (09-09-18 @ 14:50)  HR: 75 (09-09-18 @ 16:30)  BP: 115/55 (09-09-18 @ 16:30)  RR: 18 (09-09-18 @ 16:30)  SpO2: 96% (09-09-18 @ 16:30)  Wt(kg): --    PHYSICAL EXAM:  General: no acute distress  Eyes:  anicteric, no conjunctival injection, no discharge  Oropharynx: no lesions or injection 	  Neck: supple, without adenopathy  Lungs: clear to auscultation  Heart: regular rate and rhythm; no murmur, rubs or gallops  Abdomen: soft, nondistended, nontender, without mass or organomegaly  Skin: no lesions  Extremities: no clubbing, cyanosis, or edema  Neurologic: not verbal     LAB RESULTS:                        11.8   7.53  )-----------( 209      ( 09 Sep 2018 07:55 )             37.3     09-09    144  |  108  |  11  ----------------------------<  105<H>  3.4<L>   |  24  |  1.01    Ca    8.5      09 Sep 2018 06:18          MICROBIOLOGY:  RECENT CULTURES:  09-06 @ 05:09 .Urine Catheterized     >100,000 CFU/ml Aerococcus species  "Aerococcus spp. are predictably susceptible to penicillin,  ampicillin, tetracycline, and vancomycin.  All isolates are  resistant to sulfonamides"          RADIOLOGY REVIEWED:        Antimicrobials Day #      Other Medications Reviewed

## 2018-09-09 NOTE — PROGRESS NOTE ADULT - ASSESSMENT
pt    with  h/o  parkinson's ,    dementia,,    v/p  shunt, turp,  hydrocephalus  , admitted   for increasing agitation/  confusion  for past  week per  family,   pt  ha s been bed  bound for past week  constipated  for more than  5  days, per   family, on admisssion   pt is  non verbal  dehydration/ iv fluids  laxatives/  enema  on dysphagia  2 diet/   haldol prn  for agitation, if   agitation worsens,  then  psych  to be called  dvt ppx   PT eval  low grade fever, urine  . c/ s  negative/ remains  on rocephin  rvp  ID eval  hypokalemia pt    with  h/o  parkinson's ,    dementia,,    v/p  shunt, turp,  hydrocephalus  , admitted   for increasing agitation/  confusion  for past  week per  family,   pt  ha s been bed  bound for past week  constipated  for more than  5  days, per   family, on admisssion   pt is  non verbal  dehydration/ iv fluids  laxatives/  enema  on dysphagia  2 diet/   haldol prn  for agitation, if   agitation worsens,  then  psych  to be called  dvt ppx   PT eval  low grade fever, resolved, normal wbc  now,  urine  . c/ s  negative/  rocephin  stopped  hypokalemia, repleted

## 2018-09-10 LAB
ANION GAP SERPL CALC-SCNC: 14 MMOL/L — SIGNIFICANT CHANGE UP (ref 5–17)
BUN SERPL-MCNC: 12 MG/DL — SIGNIFICANT CHANGE UP (ref 7–23)
CALCIUM SERPL-MCNC: 8.6 MG/DL — SIGNIFICANT CHANGE UP (ref 8.4–10.5)
CHLORIDE SERPL-SCNC: 107 MMOL/L — SIGNIFICANT CHANGE UP (ref 96–108)
CO2 SERPL-SCNC: 24 MMOL/L — SIGNIFICANT CHANGE UP (ref 22–31)
CREAT SERPL-MCNC: 0.88 MG/DL — SIGNIFICANT CHANGE UP (ref 0.5–1.3)
GLUCOSE SERPL-MCNC: 118 MG/DL — HIGH (ref 70–99)
POTASSIUM SERPL-MCNC: 3.3 MMOL/L — LOW (ref 3.5–5.3)
POTASSIUM SERPL-SCNC: 3.3 MMOL/L — LOW (ref 3.5–5.3)
SODIUM SERPL-SCNC: 145 MMOL/L — SIGNIFICANT CHANGE UP (ref 135–145)

## 2018-09-10 RX ORDER — POTASSIUM CHLORIDE 20 MEQ
20 PACKET (EA) ORAL ONCE
Qty: 0 | Refills: 0 | Status: COMPLETED | OUTPATIENT
Start: 2018-09-10 | End: 2018-09-10

## 2018-09-10 RX ORDER — POTASSIUM CHLORIDE 20 MEQ
20 PACKET (EA) ORAL ONCE
Qty: 0 | Refills: 0 | Status: DISCONTINUED | OUTPATIENT
Start: 2018-09-10 | End: 2018-09-10

## 2018-09-10 RX ADMIN — CARBIDOPA AND LEVODOPA 1 TABLET(S): 25; 100 TABLET ORAL at 05:12

## 2018-09-10 RX ADMIN — QUETIAPINE FUMARATE 25 MILLIGRAM(S): 200 TABLET, FILM COATED ORAL at 21:48

## 2018-09-10 RX ADMIN — Medication 500 MILLIGRAM(S): at 17:47

## 2018-09-10 RX ADMIN — ATORVASTATIN CALCIUM 20 MILLIGRAM(S): 80 TABLET, FILM COATED ORAL at 21:48

## 2018-09-10 RX ADMIN — CARBIDOPA AND LEVODOPA 1 TABLET(S): 25; 100 TABLET ORAL at 21:48

## 2018-09-10 RX ADMIN — SENNA PLUS 2 TABLET(S): 8.6 TABLET ORAL at 21:48

## 2018-09-10 RX ADMIN — FINASTERIDE 5 MILLIGRAM(S): 5 TABLET, FILM COATED ORAL at 11:06

## 2018-09-10 RX ADMIN — Medication 20 MILLIEQUIVALENT(S): at 12:56

## 2018-09-10 RX ADMIN — CARBIDOPA AND LEVODOPA 1 TABLET(S): 25; 100 TABLET ORAL at 12:57

## 2018-09-10 RX ADMIN — HEPARIN SODIUM 5000 UNIT(S): 5000 INJECTION INTRAVENOUS; SUBCUTANEOUS at 11:06

## 2018-09-10 RX ADMIN — Medication 500 MILLIGRAM(S): at 05:12

## 2018-09-10 RX ADMIN — QUETIAPINE FUMARATE 25 MILLIGRAM(S): 200 TABLET, FILM COATED ORAL at 11:03

## 2018-09-10 RX ADMIN — HEPARIN SODIUM 5000 UNIT(S): 5000 INJECTION INTRAVENOUS; SUBCUTANEOUS at 21:48

## 2018-09-10 NOTE — PROGRESS NOTE ADULT - SUBJECTIVE AND OBJECTIVE BOX
resting,  advanced  dementia    REVIEW OF SYSTEMS:  GEN: no fever,    no chills  RESP: no SOB,   no cough  CVS: no chest pain,   no palpitations  GI: no abdominal pain,   no nausea,   no vomiting,   no constipation,   no diarrhea  : no dysuria,   no frequency  NEURO: no headache,   no dizziness  PSYCH: no depression,   not anxious  Derm : no rash    MEDICATIONS  (STANDING):  atorvastatin 20 milliGRAM(s) Oral at bedtime  carbidopa/levodopa  25/100 1 Tablet(s) Oral three times a day  cefuroxime   Tablet 500 milliGRAM(s) Oral every 12 hours  finasteride 5 milliGRAM(s) Oral daily  haloperidol    Injectable 1 milliGRAM(s) IntraMuscular once  heparin  Injectable 5000 Unit(s) SubCutaneous every 12 hours  potassium chloride    Tablet ER 20 milliEquivalent(s) Oral once  QUEtiapine 25 milliGRAM(s) Oral two times a day  senna 2 Tablet(s) Oral at bedtime    MEDICATIONS  (PRN):  acetaminophen   Tablet .. 650 milliGRAM(s) Oral every 6 hours PRN Temp greater or equal to 38.5C (101.3F)  haloperidol    Injectable 1 milliGRAM(s) IntraMuscular every 12 hours PRN Agitation      Vital Signs Last 24 Hrs  T(C): 36.9 (10 Sep 2018 05:03), Max: 37.1 (09 Sep 2018 14:50)  T(F): 98.5 (10 Sep 2018 05:03), Max: 98.8 (09 Sep 2018 14:50)  HR: 64 (10 Sep 2018 05:03) (64 - 92)  BP: 152/69 (10 Sep 2018 05:03) (115/55 - 160/77)  BP(mean): --  RR: 18 (10 Sep 2018 05:03) (18 - 18)  SpO2: 96% (10 Sep 2018 05:03) (95% - 96%)  CAPILLARY BLOOD GLUCOSE        I&O's Summary    09 Sep 2018 07:01  -  10 Sep 2018 07:00  --------------------------------------------------------  IN: 720 mL / OUT: 0 mL / NET: 720 mL    10 Sep 2018 07:01  -  10 Sep 2018 10:01  --------------------------------------------------------  IN: 240 mL / OUT: 0 mL / NET: 240 mL        PHYSICAL EXAM:  HEAD:  Atraumatic, Normocephalic  NECK: Supple, No   JVD  CHEST/LUNG:   no     rales,     no,    rhonchi  HEART: Regular rate and rhythm;         murmur  ABDOMEN: Soft, Nontender, ;   EXTREMITIES:     edema  NEUROLOGY:  dementia    LABS:                        11.8   7.53  )-----------( 209      ( 09 Sep 2018 07:55 )             37.3     09-10    145  |  107  |  12  ----------------------------<  118<H>  3.3<L>   |  24  |  0.88    Ca    8.6      10 Sep 2018 07:26                              Consultant(s) Notes Reviewed:      Care Discussed with Consultants/Other Providers:

## 2018-09-10 NOTE — PROGRESS NOTE ADULT - ASSESSMENT
pt    with  h/o  parkinson's ,    dementia,,    v/p  shunt, turp,  hydrocephalus  , admitted   for increasing agitation/  confusion  for past  week per  family,   pt  ha s been bed  bound for past week  constipated  for more than  5  days, per   family, on admisssion   pt is  non verbal  dehydration/ iv fluids  laxatives/  enema  on dysphagia  2 diet/   haldol prn  for agitation, if   agitation worsens,  then  psych  to be called  dvt ppx   PT eval  low grade fever, resolved, normal wbc  now,  urine  . c/ s  negative/  rocephin  stopped  hypokalemia, repleted  disposition  pending, family  interested  in  long term planning

## 2018-09-10 NOTE — PROGRESS NOTE ADULT - SUBJECTIVE AND OBJECTIVE BOX
CC: f/u for aerococcus uti    Patient reports nothing intelligible    REVIEW OF SYSTEMS:  All other review of systems  (Comprehensive ROS) cannot get    Antimicrobials Day #  :5/7  cefuroxime   Tablet 500 milliGRAM(s) Oral every 12 hours    Other Medications Reviewed    T(F): 98.5 (09-10-18 @ 05:03), Max: 98.8 (09-09-18 @ 14:50)  HR: 64 (09-10-18 @ 05:03)  BP: 152/69 (09-10-18 @ 05:03)  RR: 18 (09-10-18 @ 05:03)  SpO2: 96% (09-10-18 @ 05:03)  Wt(kg): --    PHYSICAL EXAM:  General: alert, no acute distress  Eyes:  anicteric, no conjunctival injection, no discharge  Oropharynx: no lesions or injection 	  Neck: supple, without adenopathy  Lungs: clear to auscultation  Heart: regular rate and rhythm; no murmur, rubs or gallops  Abdomen: soft, nondistended, nontender, without mass or organomegaly  Skin: no lesions  Extremities: no clubbing, cyanosis, or edema  Neurologic: alert,moves all extremities  scrotum no swelling  LAB RESULTS:                        11.8   7.53  )-----------( 209      ( 09 Sep 2018 07:55 )             37.3     09-10    145  |  107  |  12  ----------------------------<  118<H>  3.3<L>   |  24  |  0.88    Ca    8.6      10 Sep 2018 07:26          MICROBIOLOGY:  RECENT CULTURES:  09-06 @ 05:09 .Urine Catheterized     >100,000 CFU/ml Aerococcus species  "Aerococcus spp. are predictably susceptible to penicillin,  ampicillin, tetracycline, and vancomycin.  All isolates are  resistant to sulfonamides"          RADIOLOGY REVIEWED:  < from: CT Head No Cont (09.05.18 @ 21:49) >  IMPRESSION:   No acute territorial infarct, hemorrhage or mass effect. No significant   interval change compared with a CT of the head from 8/19/2016.    < end of copied text >      Assessment:  Patient with aerococcus uti finishing course of po ceftin. No uncontrolled infection is apparent at present  Plan:  2 more days of po ceftin

## 2018-09-11 ENCOUNTER — TRANSCRIPTION ENCOUNTER (OUTPATIENT)
Age: 83
End: 2018-09-11

## 2018-09-11 VITALS
RESPIRATION RATE: 18 BRPM | OXYGEN SATURATION: 95 % | SYSTOLIC BLOOD PRESSURE: 137 MMHG | HEART RATE: 80 BPM | DIASTOLIC BLOOD PRESSURE: 77 MMHG | TEMPERATURE: 98 F

## 2018-09-11 PROCEDURE — 87633 RESP VIRUS 12-25 TARGETS: CPT

## 2018-09-11 PROCEDURE — 71045 X-RAY EXAM CHEST 1 VIEW: CPT

## 2018-09-11 PROCEDURE — 84295 ASSAY OF SERUM SODIUM: CPT

## 2018-09-11 PROCEDURE — 82803 BLOOD GASES ANY COMBINATION: CPT

## 2018-09-11 PROCEDURE — 84145 PROCALCITONIN (PCT): CPT

## 2018-09-11 PROCEDURE — 82947 ASSAY GLUCOSE BLOOD QUANT: CPT

## 2018-09-11 PROCEDURE — 97530 THERAPEUTIC ACTIVITIES: CPT

## 2018-09-11 PROCEDURE — 97162 PT EVAL MOD COMPLEX 30 MIN: CPT

## 2018-09-11 PROCEDURE — 87581 M.PNEUMON DNA AMP PROBE: CPT

## 2018-09-11 PROCEDURE — 82435 ASSAY OF BLOOD CHLORIDE: CPT

## 2018-09-11 PROCEDURE — 97116 GAIT TRAINING THERAPY: CPT

## 2018-09-11 PROCEDURE — 85027 COMPLETE CBC AUTOMATED: CPT

## 2018-09-11 PROCEDURE — 70450 CT HEAD/BRAIN W/O DYE: CPT

## 2018-09-11 PROCEDURE — 81001 URINALYSIS AUTO W/SCOPE: CPT

## 2018-09-11 PROCEDURE — 87798 DETECT AGENT NOS DNA AMP: CPT

## 2018-09-11 PROCEDURE — 87086 URINE CULTURE/COLONY COUNT: CPT

## 2018-09-11 PROCEDURE — 82330 ASSAY OF CALCIUM: CPT

## 2018-09-11 PROCEDURE — 74018 RADEX ABDOMEN 1 VIEW: CPT

## 2018-09-11 PROCEDURE — 80048 BASIC METABOLIC PNL TOTAL CA: CPT

## 2018-09-11 PROCEDURE — 84132 ASSAY OF SERUM POTASSIUM: CPT

## 2018-09-11 PROCEDURE — 83605 ASSAY OF LACTIC ACID: CPT

## 2018-09-11 PROCEDURE — 80053 COMPREHEN METABOLIC PANEL: CPT

## 2018-09-11 PROCEDURE — 87486 CHLMYD PNEUM DNA AMP PROBE: CPT

## 2018-09-11 PROCEDURE — 93005 ELECTROCARDIOGRAM TRACING: CPT

## 2018-09-11 PROCEDURE — 99285 EMERGENCY DEPT VISIT HI MDM: CPT | Mod: 25

## 2018-09-11 PROCEDURE — 96372 THER/PROPH/DIAG INJ SC/IM: CPT

## 2018-09-11 PROCEDURE — 85014 HEMATOCRIT: CPT

## 2018-09-11 PROCEDURE — 84484 ASSAY OF TROPONIN QUANT: CPT

## 2018-09-11 RX ORDER — QUETIAPINE FUMARATE 200 MG/1
1 TABLET, FILM COATED ORAL
Qty: 0 | Refills: 0 | COMMUNITY
Start: 2018-09-11

## 2018-09-11 RX ORDER — SENNA PLUS 8.6 MG/1
2 TABLET ORAL
Qty: 0 | Refills: 0 | COMMUNITY
Start: 2018-09-11

## 2018-09-11 RX ORDER — QUETIAPINE FUMARATE 200 MG/1
0 TABLET, FILM COATED ORAL
Qty: 0 | Refills: 0 | COMMUNITY

## 2018-09-11 RX ORDER — CEFUROXIME AXETIL 250 MG
1 TABLET ORAL
Qty: 0 | Refills: 0 | COMMUNITY
Start: 2018-09-11

## 2018-09-11 RX ORDER — CARBIDOPA AND LEVODOPA 25; 100 MG/1; MG/1
1 TABLET ORAL
Qty: 0 | Refills: 0 | COMMUNITY
Start: 2018-09-11

## 2018-09-11 RX ORDER — ATORVASTATIN CALCIUM 80 MG/1
1 TABLET, FILM COATED ORAL
Qty: 0 | Refills: 0 | COMMUNITY
Start: 2018-09-11

## 2018-09-11 RX ORDER — ATORVASTATIN CALCIUM 80 MG/1
1 TABLET, FILM COATED ORAL
Qty: 0 | Refills: 0 | COMMUNITY

## 2018-09-11 RX ORDER — CARBIDOPA AND LEVODOPA 25; 100 MG/1; MG/1
1 TABLET ORAL
Qty: 0 | Refills: 0 | COMMUNITY

## 2018-09-11 RX ORDER — ACETAMINOPHEN 500 MG
2 TABLET ORAL
Qty: 0 | Refills: 0 | COMMUNITY
Start: 2018-09-11

## 2018-09-11 RX ADMIN — Medication 500 MILLIGRAM(S): at 06:09

## 2018-09-11 RX ADMIN — CARBIDOPA AND LEVODOPA 1 TABLET(S): 25; 100 TABLET ORAL at 15:06

## 2018-09-11 RX ADMIN — HEPARIN SODIUM 5000 UNIT(S): 5000 INJECTION INTRAVENOUS; SUBCUTANEOUS at 09:03

## 2018-09-11 RX ADMIN — FINASTERIDE 5 MILLIGRAM(S): 5 TABLET, FILM COATED ORAL at 12:30

## 2018-09-11 RX ADMIN — QUETIAPINE FUMARATE 25 MILLIGRAM(S): 200 TABLET, FILM COATED ORAL at 09:03

## 2018-09-11 RX ADMIN — CARBIDOPA AND LEVODOPA 1 TABLET(S): 25; 100 TABLET ORAL at 06:09

## 2018-09-11 NOTE — PROGRESS NOTE ADULT - PROVIDER SPECIALTY LIST ADULT
Infectious Disease
Internal Medicine

## 2018-09-11 NOTE — DISCHARGE NOTE ADULT - CARE PLAN
Principal Discharge DX:	Urinary tract infection without hematuria, site unspecified  Goal:	Free from reoccurrence of infection  Assessment and plan of treatment:	Completed course of intravenous antibiotics  Secondary Diagnosis:	Parkinsons disease  Assessment and plan of treatment:	Continue with Sinemet as prescribed  Secondary Diagnosis:	Dementia  Assessment and plan of treatment:	Continue with Seroquel Principal Discharge DX:	Urinary tract infection without hematuria, site unspecified  Goal:	Free from reoccurrence of infection  Assessment and plan of treatment:	Completed course of intravenous antibiotics  Complete one more day of po ceftin  Secondary Diagnosis:	Parkinsons disease  Assessment and plan of treatment:	Continue with Sinemet as prescribed  Secondary Diagnosis:	Dementia  Assessment and plan of treatment:	Continue with Seroquel

## 2018-09-11 NOTE — PROGRESS NOTE ADULT - SUBJECTIVE AND OBJECTIVE BOX
Awake alert.  no verbal response.  Case discussed at length with daughter Sammie yesterday.  He is improved since treatment of UTI. finishing last days of oral abx.  no overt infection per Dr. Hamilton.  He is eating.  Pt scheduled for LATONYA on D/C and is otherwise ready.          Vital Signs Last 24 Hrs  T(C): 36.9 (11 Sep 2018 04:43), Max: 37.2 (10 Sep 2018 12:44)  T(F): 98.4 (11 Sep 2018 04:43), Max: 99 (10 Sep 2018 19:29)  HR: 75 (11 Sep 2018 04:43) (75 - 80)  BP: 146/87 (11 Sep 2018 04:43) (113/52 - 165/81)  BP(mean): --  RR: 18 (11 Sep 2018 04:43) (18 - 20)  SpO2: 98% (11 Sep 2018 04:43) (95% - 98%)  Daily     Daily     09-10 @ 07:01  -  09-11 @ 07:00  --------------------------------------------------------  IN: 840 mL / OUT: 0 mL / NET: 840 mL        PHYSICAL EXAM:    Eyes: Open to arousal, non verbal    ENMT: anicteric    Neck: No increased JVP    Respiratory: Lungs clear    Cardiovascular:  RRR nl S1S2, no M    Gastrointestinal:  Soft, NT    Extremities:  No edema    Neurological: Non verbal, moves all fours responds to voice    Skin: Warm and dry    Psychiatric:  Calm currently                                        11.8   7.53  )-----------( 209      ( 09 Sep 2018 07:55 )             37.3     09-10    145  |  107  |  12  ----------------------------<  118<H>  3.3<L>   |  24  |  0.88    Ca    8.6      10 Sep 2018 07:26            MEDICATIONS  (STANDING):  atorvastatin 20 milliGRAM(s) Oral at bedtime  carbidopa/levodopa  25/100 1 Tablet(s) Oral three times a day  cefuroxime   Tablet 500 milliGRAM(s) Oral every 12 hours  finasteride 5 milliGRAM(s) Oral daily  haloperidol    Injectable 1 milliGRAM(s) IntraMuscular once  heparin  Injectable 5000 Unit(s) SubCutaneous every 12 hours  QUEtiapine 25 milliGRAM(s) Oral two times a day  senna 2 Tablet(s) Oral at bedtime    MEDICATIONS  (PRN):  acetaminophen   Tablet .. 650 milliGRAM(s) Oral every 6 hours PRN Temp greater or equal to 38.5C (101.3F)  haloperidol    Injectable 1 milliGRAM(s) IntraMuscular every 12 hours PRN Agitation      Impression    Depression, Dementia, NPH, PD with overall worsening. Occasional behavioral abnormalities but has been calm on current medications.   UTI contributing. Improved since adequate treatment.  no current evidence of active infection.  Dementia at baseline, recently worsened  NPH    Plan  Haldol and Seroquel as ordered  complete abx- po Ceftin  VTE prophylaxis  Case management and social work to work with family for discharge planning.  OK for rehab discharge when bed available.    Abdi Blas MD  [phone]

## 2018-09-11 NOTE — DISCHARGE NOTE ADULT - PLAN OF CARE
Free from reoccurrence of infection Completed course of intravenous antibiotics Continue with Sinemet as prescribed Continue with Seroquel Completed course of intravenous antibiotics  Complete one more day of po ceftin

## 2018-09-11 NOTE — PROGRESS NOTE ADULT - SUBJECTIVE AND OBJECTIVE BOX
CC: f/u for UTI    Patient reports: he is non verbal, discharge planning in place    REVIEW OF SYSTEMS:  All other review of systems negative (Comprehensive ROS)    Antimicrobials Day #  :6/7  cefuroxime   Tablet 500 milliGRAM(s) Oral every 12 hours    Other Medications Reviewed    T(F): 97.7 (09-11-18 @ 09:16), Max: 99 (09-10-18 @ 19:29)  HR: 88 (09-11-18 @ 09:55)  BP: 126/72 (09-11-18 @ 09:55)  RR: 18 (09-11-18 @ 09:55)  SpO2: 97% (09-11-18 @ 09:55)  Wt(kg): --    PHYSICAL EXAM:  General: alert, no acute distress  Eyes:  anicteric, no conjunctival injection, no discharge  Oropharynx: no lesions or injection 	  Neck: supple, without adenopathy  Lungs: clear to auscultation  Heart: regular rate and rhythm; no murmur, rubs or gallops  Abdomen: soft, nondistended, nontender, without mass or organomegaly  Skin: no lesions  Extremities: no clubbing, cyanosis, or edema  Neurologic: alert, non verbal, does not follow commands, increased tone    LAB RESULTS:    09-10    145  |  107  |  12  ----------------------------<  118<H>  3.3<L>   |  24  |  0.88    Ca    8.6      10 Sep 2018 07:26          MICROBIOLOGY:  RECENT CULTURES:  Urine with aerococcus    RADIOLOGY REVIEWED:  < from: Xray Chest 1 View AP/PA (09.05.18 @ 21:34) >  IMPRESSION:   Clear lungs.    < end of copied text >

## 2018-09-11 NOTE — DISCHARGE NOTE ADULT - PATIENT PORTAL LINK FT
You can access the [facility] Patient Portal, offered by [facility], by registering with the following website: [email or web address]

## 2018-09-11 NOTE — PROGRESS NOTE ADULT - ASSESSMENT
Parkinsons, NPH,and dementia  Aerococcus UTI on therapy  limit antibiotics to one more day  no objection to discharge planning  [facility] guarded

## 2018-09-11 NOTE — DISCHARGE NOTE ADULT - MEDICATION SUMMARY - MEDICATIONS TO TAKE
I will START or STAY ON the medications listed below when I get home from the hospital:    Avodart 0.5 mg oral capsule  -- 1 cap(s) by mouth once a day  -- Indication: For Prostate    acetaminophen 325 mg oral tablet  -- 2 tab(s) by mouth every 6 hours, As needed, Temp greater or equal to 38.5C (101.3F)  -- Indication: For fever/pain    atorvastatin 20 mg oral tablet  -- 1 tab(s) by mouth once a day (at bedtime)  -- Indication: For cholesterol    carbidopa-levodopa 25 mg-100 mg oral tablet  -- 1 tab(s) by mouth 3 times a day  -- Indication: For Parkinsons disease    QUEtiapine 25 mg oral tablet  -- 1 tab(s) by mouth 2 times a day  -- Indication: For Dementia    cefuroxime 500 mg oral tablet  -- 1 tab(s) by mouth every 12 hours  -- Indication: For Antibiotic for 1 more day    senna oral tablet  -- 2 tab(s) by mouth once a day (at bedtime)  -- Indication: For constipation

## 2018-09-11 NOTE — DISCHARGE NOTE ADULT - HOSPITAL COURSE
Depression, Dementia, NPH, PD with overall worsening. Occasional behavioral abnormalities but has been calm on current medications.   UTI contributing. Improved since adequate treatment.  no current evidence of active infection.  Dementia at baseline, recently worsened  NPH    Plan  Haldol and Seroquel as ordered  completed abx- po Ceftin
